# Patient Record
Sex: FEMALE | Race: ASIAN | Employment: FULL TIME | URBAN - METROPOLITAN AREA
[De-identification: names, ages, dates, MRNs, and addresses within clinical notes are randomized per-mention and may not be internally consistent; named-entity substitution may affect disease eponyms.]

---

## 2018-06-18 LAB
HBV SURFACE AG SERPL QL IA: NONREACTIVE
RUBELLA ANTIBODY IGG QUANTITATIVE: NORMAL IU/ML

## 2018-12-30 ENCOUNTER — HOSPITAL ENCOUNTER (INPATIENT)
Facility: CLINIC | Age: 38
LOS: 1 days | Discharge: HOME | End: 2018-12-31
Attending: OBSTETRICS & GYNECOLOGY | Admitting: OBSTETRICS & GYNECOLOGY
Payer: COMMERCIAL

## 2018-12-30 ENCOUNTER — HOSPITAL ENCOUNTER (OUTPATIENT)
Facility: CLINIC | Age: 38
End: 2018-12-30

## 2018-12-30 PROBLEM — Z34.90 TERM PREGNANCY: Status: ACTIVE | Noted: 2018-12-30

## 2018-12-30 LAB
ABO + RH BLD: NORMAL
ABO + RH BLD: NORMAL
ALT SERPL W P-5'-P-CCNC: 11 U/L (ref 0–50)
AMPHETAMINES UR QL SCN: NEGATIVE
AST SERPL W P-5'-P-CCNC: 11 U/L (ref 0–45)
BASOPHILS # BLD AUTO: 0 10E9/L (ref 0–0.2)
BASOPHILS NFR BLD AUTO: 0.3 %
BUN SERPL-MCNC: 12 MG/DL (ref 7–30)
CANNABINOIDS UR QL: NEGATIVE
COCAINE UR QL: NEGATIVE
CREAT SERPL-MCNC: 0.67 MG/DL (ref 0.52–1.04)
DIFFERENTIAL METHOD BLD: NORMAL
EOSINOPHIL # BLD AUTO: 0.2 10E9/L (ref 0–0.7)
EOSINOPHIL NFR BLD AUTO: 1.6 %
ERYTHROCYTE [DISTWIDTH] IN BLOOD BY AUTOMATED COUNT: 13.2 % (ref 10–15)
GFR SERPL CREATININE-BSD FRML MDRD: >90 ML/MIN/{1.73_M2}
GLUCOSE BLDC GLUCOMTR-MCNC: 75 MG/DL (ref 70–99)
GLUCOSE SERPL-MCNC: 90 MG/DL (ref 70–99)
HCT VFR BLD AUTO: 36.2 % (ref 35–47)
HGB BLD-MCNC: 12.3 G/DL (ref 11.7–15.7)
IMM GRANULOCYTES # BLD: 0.1 10E9/L (ref 0–0.4)
IMM GRANULOCYTES NFR BLD: 0.5 %
LYMPHOCYTES # BLD AUTO: 3.1 10E9/L (ref 0.8–5.3)
LYMPHOCYTES NFR BLD AUTO: 30.3 %
MCH RBC QN AUTO: 31.1 PG (ref 26.5–33)
MCHC RBC AUTO-ENTMCNC: 34 G/DL (ref 31.5–36.5)
MCV RBC AUTO: 91 FL (ref 78–100)
MONOCYTES # BLD AUTO: 0.8 10E9/L (ref 0–1.3)
MONOCYTES NFR BLD AUTO: 7.8 %
NEUTROPHILS # BLD AUTO: 6 10E9/L (ref 1.6–8.3)
NEUTROPHILS NFR BLD AUTO: 59.5 %
NRBC # BLD AUTO: 0 10*3/UL
NRBC BLD AUTO-RTO: 0 /100
OPIATES UR QL SCN: NEGATIVE
PCP UR QL SCN: NEGATIVE
PLATELET # BLD AUTO: 185 10E9/L (ref 150–450)
RBC # BLD AUTO: 3.96 10E12/L (ref 3.8–5.2)
SPECIMEN EXP DATE BLD: NORMAL
T PALLIDUM AB SER QL: NONREACTIVE
WBC # BLD AUTO: 10.1 10E9/L (ref 4–11)

## 2018-12-30 PROCEDURE — 00000146 ZZHCL STATISTIC GLUCOSE BY METER IP

## 2018-12-30 PROCEDURE — 82565 ASSAY OF CREATININE: CPT | Performed by: OBSTETRICS & GYNECOLOGY

## 2018-12-30 PROCEDURE — 84450 TRANSFERASE (AST) (SGOT): CPT | Performed by: OBSTETRICS & GYNECOLOGY

## 2018-12-30 PROCEDURE — 85025 COMPLETE CBC W/AUTO DIFF WBC: CPT | Performed by: OBSTETRICS & GYNECOLOGY

## 2018-12-30 PROCEDURE — 80307 DRUG TEST PRSMV CHEM ANLYZR: CPT | Performed by: OBSTETRICS & GYNECOLOGY

## 2018-12-30 PROCEDURE — 25000132 ZZH RX MED GY IP 250 OP 250 PS 637: Performed by: OBSTETRICS & GYNECOLOGY

## 2018-12-30 PROCEDURE — 12000037 ZZH R&B POSTPARTUM INTERMEDIATE

## 2018-12-30 PROCEDURE — 86780 TREPONEMA PALLIDUM: CPT | Performed by: OBSTETRICS & GYNECOLOGY

## 2018-12-30 PROCEDURE — 36415 COLL VENOUS BLD VENIPUNCTURE: CPT | Performed by: OBSTETRICS & GYNECOLOGY

## 2018-12-30 PROCEDURE — G0463 HOSPITAL OUTPT CLINIC VISIT: HCPCS

## 2018-12-30 PROCEDURE — 0HQ9XZZ REPAIR PERINEUM SKIN, EXTERNAL APPROACH: ICD-10-PCS | Performed by: OBSTETRICS & GYNECOLOGY

## 2018-12-30 PROCEDURE — 25000128 H RX IP 250 OP 636: Performed by: OBSTETRICS & GYNECOLOGY

## 2018-12-30 PROCEDURE — 86900 BLOOD TYPING SEROLOGIC ABO: CPT | Performed by: OBSTETRICS & GYNECOLOGY

## 2018-12-30 PROCEDURE — 82947 ASSAY GLUCOSE BLOOD QUANT: CPT | Performed by: OBSTETRICS & GYNECOLOGY

## 2018-12-30 PROCEDURE — 25000125 ZZHC RX 250

## 2018-12-30 PROCEDURE — 25000125 ZZHC RX 250: Performed by: OBSTETRICS & GYNECOLOGY

## 2018-12-30 PROCEDURE — 72200001 ZZH LABOR CARE VAGINAL DELIVERY SINGLE

## 2018-12-30 PROCEDURE — 84520 ASSAY OF UREA NITROGEN: CPT | Performed by: OBSTETRICS & GYNECOLOGY

## 2018-12-30 PROCEDURE — 10907ZC DRAINAGE OF AMNIOTIC FLUID, THERAPEUTIC FROM PRODUCTS OF CONCEPTION, VIA NATURAL OR ARTIFICIAL OPENING: ICD-10-PCS | Performed by: OBSTETRICS & GYNECOLOGY

## 2018-12-30 PROCEDURE — 84460 ALANINE AMINO (ALT) (SGPT): CPT | Performed by: OBSTETRICS & GYNECOLOGY

## 2018-12-30 PROCEDURE — 86901 BLOOD TYPING SEROLOGIC RH(D): CPT | Performed by: OBSTETRICS & GYNECOLOGY

## 2018-12-30 RX ORDER — CETIRIZINE HYDROCHLORIDE 5 MG/1
5 TABLET ORAL DAILY PRN
COMMUNITY

## 2018-12-30 RX ORDER — OXYTOCIN 10 [USP'U]/ML
10 INJECTION, SOLUTION INTRAMUSCULAR; INTRAVENOUS
Status: DISCONTINUED | OUTPATIENT
Start: 2018-12-30 | End: 2018-12-31 | Stop reason: HOSPADM

## 2018-12-30 RX ORDER — FENTANYL CITRATE 50 UG/ML
50-100 INJECTION, SOLUTION INTRAMUSCULAR; INTRAVENOUS
Status: DISCONTINUED | OUTPATIENT
Start: 2018-12-30 | End: 2018-12-30

## 2018-12-30 RX ORDER — IBUPROFEN 400 MG/1
800 TABLET, FILM COATED ORAL
Status: DISCONTINUED | OUTPATIENT
Start: 2018-12-30 | End: 2018-12-30

## 2018-12-30 RX ORDER — DEXTROSE MONOHYDRATE 25 G/50ML
25-50 INJECTION, SOLUTION INTRAVENOUS
Status: DISCONTINUED | OUTPATIENT
Start: 2018-12-30 | End: 2018-12-31 | Stop reason: HOSPADM

## 2018-12-30 RX ORDER — NALOXONE HYDROCHLORIDE 0.4 MG/ML
.1-.4 INJECTION, SOLUTION INTRAMUSCULAR; INTRAVENOUS; SUBCUTANEOUS
Status: DISCONTINUED | OUTPATIENT
Start: 2018-12-30 | End: 2018-12-31 | Stop reason: HOSPADM

## 2018-12-30 RX ORDER — NICOTINE POLACRILEX 4 MG
15-30 LOZENGE BUCCAL
Status: DISCONTINUED | OUTPATIENT
Start: 2018-12-30 | End: 2018-12-31 | Stop reason: HOSPADM

## 2018-12-30 RX ORDER — OXYTOCIN/0.9 % SODIUM CHLORIDE 30/500 ML
340 PLASTIC BAG, INJECTION (ML) INTRAVENOUS CONTINUOUS PRN
Status: DISCONTINUED | OUTPATIENT
Start: 2018-12-30 | End: 2018-12-31 | Stop reason: HOSPADM

## 2018-12-30 RX ORDER — NALBUPHINE HYDROCHLORIDE 10 MG/ML
10-20 INJECTION, SOLUTION INTRAMUSCULAR; INTRAVENOUS; SUBCUTANEOUS
Status: DISCONTINUED | OUTPATIENT
Start: 2018-12-30 | End: 2018-12-30

## 2018-12-30 RX ORDER — AMOXICILLIN 250 MG
2 CAPSULE ORAL 2 TIMES DAILY
Status: DISCONTINUED | OUTPATIENT
Start: 2018-12-30 | End: 2018-12-31 | Stop reason: HOSPADM

## 2018-12-30 RX ORDER — LIDOCAINE HYDROCHLORIDE 10 MG/ML
INJECTION, SOLUTION INFILTRATION; PERINEURAL
Status: DISCONTINUED
Start: 2018-12-30 | End: 2018-12-30 | Stop reason: HOSPADM

## 2018-12-30 RX ORDER — OXYTOCIN/0.9 % SODIUM CHLORIDE 30/500 ML
100 PLASTIC BAG, INJECTION (ML) INTRAVENOUS CONTINUOUS
Status: DISCONTINUED | OUTPATIENT
Start: 2018-12-30 | End: 2018-12-31 | Stop reason: HOSPADM

## 2018-12-30 RX ORDER — OXYTOCIN/0.9 % SODIUM CHLORIDE 30/500 ML
100-340 PLASTIC BAG, INJECTION (ML) INTRAVENOUS CONTINUOUS PRN
Status: COMPLETED | OUTPATIENT
Start: 2018-12-30 | End: 2018-12-30

## 2018-12-30 RX ORDER — METHYLERGONOVINE MALEATE 0.2 MG/ML
200 INJECTION INTRAVENOUS
Status: DISCONTINUED | OUTPATIENT
Start: 2018-12-30 | End: 2018-12-30

## 2018-12-30 RX ORDER — ACETAMINOPHEN 325 MG/1
650 TABLET ORAL EVERY 4 HOURS PRN
Status: DISCONTINUED | OUTPATIENT
Start: 2018-12-30 | End: 2018-12-31 | Stop reason: HOSPADM

## 2018-12-30 RX ORDER — IBUPROFEN 400 MG/1
800 TABLET, FILM COATED ORAL EVERY 6 HOURS PRN
Status: DISCONTINUED | OUTPATIENT
Start: 2018-12-30 | End: 2018-12-31 | Stop reason: HOSPADM

## 2018-12-30 RX ORDER — OXYTOCIN 10 [USP'U]/ML
10 INJECTION, SOLUTION INTRAMUSCULAR; INTRAVENOUS
Status: DISCONTINUED | OUTPATIENT
Start: 2018-12-30 | End: 2018-12-30

## 2018-12-30 RX ORDER — CARBOPROST TROMETHAMINE 250 UG/ML
250 INJECTION, SOLUTION INTRAMUSCULAR
Status: DISCONTINUED | OUTPATIENT
Start: 2018-12-30 | End: 2018-12-30

## 2018-12-30 RX ORDER — AMOXICILLIN 250 MG
1 CAPSULE ORAL 2 TIMES DAILY
Status: DISCONTINUED | OUTPATIENT
Start: 2018-12-30 | End: 2018-12-31 | Stop reason: HOSPADM

## 2018-12-30 RX ORDER — ACETAMINOPHEN 325 MG/1
650 TABLET ORAL EVERY 4 HOURS PRN
Status: DISCONTINUED | OUTPATIENT
Start: 2018-12-30 | End: 2018-12-30

## 2018-12-30 RX ORDER — BISACODYL 10 MG
10 SUPPOSITORY, RECTAL RECTAL DAILY PRN
Status: DISCONTINUED | OUTPATIENT
Start: 2019-01-01 | End: 2018-12-31 | Stop reason: HOSPADM

## 2018-12-30 RX ORDER — OXYTOCIN/0.9 % SODIUM CHLORIDE 30/500 ML
PLASTIC BAG, INJECTION (ML) INTRAVENOUS
Status: COMPLETED
Start: 2018-12-30 | End: 2018-12-30

## 2018-12-30 RX ORDER — ONDANSETRON 2 MG/ML
4 INJECTION INTRAMUSCULAR; INTRAVENOUS EVERY 6 HOURS PRN
Status: DISCONTINUED | OUTPATIENT
Start: 2018-12-30 | End: 2018-12-30

## 2018-12-30 RX ORDER — OXYCODONE AND ACETAMINOPHEN 5; 325 MG/1; MG/1
1 TABLET ORAL
Status: DISCONTINUED | OUTPATIENT
Start: 2018-12-30 | End: 2018-12-30

## 2018-12-30 RX ORDER — SODIUM CHLORIDE, SODIUM LACTATE, POTASSIUM CHLORIDE, CALCIUM CHLORIDE 600; 310; 30; 20 MG/100ML; MG/100ML; MG/100ML; MG/100ML
INJECTION, SOLUTION INTRAVENOUS CONTINUOUS
Status: DISCONTINUED | OUTPATIENT
Start: 2018-12-30 | End: 2018-12-30

## 2018-12-30 RX ORDER — PROCHLORPERAZINE 25 MG
25 SUPPOSITORY, RECTAL RECTAL EVERY 12 HOURS PRN
Status: DISCONTINUED | OUTPATIENT
Start: 2018-12-30 | End: 2018-12-30

## 2018-12-30 RX ORDER — HYDROCORTISONE 2.5 %
CREAM (GRAM) TOPICAL 3 TIMES DAILY PRN
Status: DISCONTINUED | OUTPATIENT
Start: 2018-12-30 | End: 2018-12-31 | Stop reason: HOSPADM

## 2018-12-30 RX ORDER — LANOLIN 100 %
OINTMENT (GRAM) TOPICAL
Status: DISCONTINUED | OUTPATIENT
Start: 2018-12-30 | End: 2018-12-31 | Stop reason: HOSPADM

## 2018-12-30 RX ORDER — NALOXONE HYDROCHLORIDE 0.4 MG/ML
.1-.4 INJECTION, SOLUTION INTRAMUSCULAR; INTRAVENOUS; SUBCUTANEOUS
Status: DISCONTINUED | OUTPATIENT
Start: 2018-12-30 | End: 2018-12-30

## 2018-12-30 RX ADMIN — SENNOSIDES AND DOCUSATE SODIUM 1 TABLET: 8.6; 5 TABLET ORAL at 19:52

## 2018-12-30 RX ADMIN — IBUPROFEN 800 MG: 400 TABLET ORAL at 01:51

## 2018-12-30 RX ADMIN — OXYTOCIN-SODIUM CHLORIDE 0.9% IV SOLN 30 UNIT/500ML 100 ML/HR: 30-0.9/5 SOLUTION at 01:20

## 2018-12-30 RX ADMIN — Medication 340 ML/HR: at 00:42

## 2018-12-30 RX ADMIN — OXYTOCIN-SODIUM CHLORIDE 0.9% IV SOLN 30 UNIT/500ML 340 ML/HR: 30-0.9/5 SOLUTION at 00:42

## 2018-12-30 RX ADMIN — SENNOSIDES AND DOCUSATE SODIUM 1 TABLET: 8.6; 5 TABLET ORAL at 07:44

## 2018-12-30 RX ADMIN — ACETAMINOPHEN 650 MG: 325 TABLET, FILM COATED ORAL at 12:18

## 2018-12-30 RX ADMIN — IBUPROFEN 800 MG: 400 TABLET ORAL at 07:44

## 2018-12-30 RX ADMIN — ACETAMINOPHEN 650 MG: 325 TABLET, FILM COATED ORAL at 01:51

## 2018-12-30 RX ADMIN — IBUPROFEN 800 MG: 400 TABLET ORAL at 14:08

## 2018-12-30 RX ADMIN — SODIUM CHLORIDE, POTASSIUM CHLORIDE, SODIUM LACTATE AND CALCIUM CHLORIDE 125 ML/HR: 600; 310; 30; 20 INJECTION, SOLUTION INTRAVENOUS at 00:25

## 2018-12-30 SDOH — HEALTH STABILITY: MENTAL HEALTH: HOW OFTEN DO YOU HAVE A DRINK CONTAINING ALCOHOL?: NEVER

## 2018-12-30 ASSESSMENT — ACTIVITIES OF DAILY LIVING (ADL): FALL_HISTORY_WITHIN_LAST_SIX_MONTHS: NO

## 2018-12-30 NOTE — PLAN OF CARE
Data; Kemar Bradshaw   transferred to 409 via wheelchair at 0330. Baby transferred via parent's arms.  Action: Receiving unit notified of transfer: Yes. Patient and family notified of room change. Report given to LYNNETTE Beach at 0330 Belongings sent to receiving unit. Accompanied by Registered Nurse. Oriented patient to surroundings. Call light within reach. ID bands double-checked with receiving RN.  Response: Patient tolerated transfer and is stable.

## 2018-12-30 NOTE — PLAN OF CARE
0010- Patient arrived per wheelchair to Triage. Appears uncomfortable.Patient states is negative for GBS, has Velamentous Cord insertion and is  A gestational diabetic- diet controlled, states last ate at 2030 and last blood sugar was done at 2100 was 132. States see KENNETH Finnegan at the [facility] clinic on [facility]. Monitors applied with consent. States is having some bleeding.  Dark red blood on chux. SVE done. 8 cms. Taken to room 231 in bed. Keeping patient. Dr. Laguna notified of patient arrival, cervix exam and bleeding by Zaynab Wells. MD Here is hospital. Coming to room. IV started bu Isa Gamez RN, monitors reapplied. Patient seeling some pressure, SVE per Dr. Laguna. 9+ cm, AROM done with clear. Bloody tinged fluid.at 0028.   0038 feeling more pressure, ant lip  0040 complete, Dr. Laguna at bed side, legs in foot rest. Patient pushing.  0042 viable female infant. Pitocin stated per MD request.   0046- Placenta delivery. Small laceration repaired per Dr. Laguna.  0047 agpars 9/9

## 2018-12-30 NOTE — H&P
There has been no significant change with Kemar's general health status based upon review of records and exam.    Kemar is a 38 year old  IUP at 38 weeks presents to L/D in active labor. Was diverted from [facility].    Cx= 8cm/100%/-1 + BRB    H/O gestational diabetes diet controlled    AROM= clear fluid    GBS negative    A: IUP at 38 weeks      Active labor    P: anticipate

## 2018-12-30 NOTE — PLAN OF CARE
Pt. admitted from L&D  via wheelchair and transferred to bed at 0330. Pt. arrived with baby and was accompanied by . Arrived with personal belongings. Report was taken from LYNNETTE Umaña in L&D. VSS. Fundus is firm and midline.  Vaginal bleeding is light. IV infusing pitocin.  Pt. oriented to the room and call light system. Pain well controlled with tylenol and ibuprofen.  Planning on breastfeeding and bottling formula.  providing support at beside. Plan to check fasting blood glucose this AM. Questions encouraged. On pathway. Continue to monitor and notify MD as needed.

## 2018-12-30 NOTE — PLAN OF CARE
Patient complains of moderate uterine cramping.  Using warm packs/aqua-k for comfort.  Having adequate relief with tylenol and ibuprofen PO.  Fundus firm below umbilicus, lochia within normal limits, no clots.  Voiding adequately.  Will continue to monitor.

## 2018-12-30 NOTE — PROGRESS NOTES
Doing well. Sitting up. Baby about to get bathed.     vss afebrile  HEENT nl  Abdomen soft and non tender  Fundus firm and non tender  Extremities nl    BS= 75    A: PPD 0  S/P  was diverted from [facility]      Gestational diabetes diet controlled      Doing well  P: routine pp care      Discharge tomorrow

## 2018-12-30 NOTE — L&D DELIVERY NOTE
Delivery Date:  2018      Kemar is a 38-year-old  3, now para 3 with intrauterine pregnancy at 38 weeks gestation who was admitted to [facility] after being diverted from [facility].  She started to have strong contractions approximately 2 hours before admission.  Upon admission, she was noted to be 8 cm dilated, 100% effaced, -1 station and there was bright red blood noted in the perineal area.  The patient had a history of a velamentous cord insertion and gestational diabetes, diet-controlled.  Her blood pressure also was noted to be mildly elevated upon admission.  Artificial rupture of membranes was performed, clear fluid was noted.  At this point in time, the patient now was an anterior lip.  Within 10 minutes she was completely dilated.  She pushed through 2 contractions, delivering over a midline tear.  The anterior shoulder delivered easily followed by the rest of body.  The baby was a female infant who was vigorous and crying upon delivery.  She had Apgar scores of 9 at 1 minute and 9 at 5 minutes.  Her weight is pending at this time.  Delayed cord clamping was observed for 1 minute.  The cord was doubly clamped and ligated by the father of the baby.  The patient was given IV Pitocin.  Inspection revealed that the patient sustained a first-degree midline tear.  This area was infiltrated with 1% lidocaine, approximately 5 mL was used, and it was repaired with 3-0 Vicryl and 3-0 chromic suture in the usual manner.  The QEBL is pending at this time.  PIH labs have been drawn and are pending.  The patient and the baby otherwise are doing well post repair.         AMANDO HUNG MD             D: 2018   T: 2018   MT: MARLON      Name:     KEMAR GUIDRY   MRN:      [id number]        Account:        [id number]   :      1980        Delivery Date:  2018               Document: [id number]       cc: Amando Hung MD

## 2018-12-31 VITALS
SYSTOLIC BLOOD PRESSURE: 127 MMHG | RESPIRATION RATE: 16 BRPM | TEMPERATURE: 98.2 F | HEART RATE: 96 BPM | DIASTOLIC BLOOD PRESSURE: 91 MMHG

## 2018-12-31 PROCEDURE — 25000132 ZZH RX MED GY IP 250 OP 250 PS 637: Performed by: OBSTETRICS & GYNECOLOGY

## 2018-12-31 RX ORDER — BREAST PUMP
1 EACH MISCELLANEOUS ONCE
Qty: 1 EACH | Refills: 0 | Status: SHIPPED | OUTPATIENT
Start: 2018-12-31 | End: 2018-12-31

## 2018-12-31 RX ADMIN — ACETAMINOPHEN 650 MG: 325 TABLET, FILM COATED ORAL at 06:24

## 2018-12-31 RX ADMIN — IBUPROFEN 800 MG: 400 TABLET ORAL at 09:43

## 2018-12-31 RX ADMIN — SENNOSIDES AND DOCUSATE SODIUM 1 TABLET: 8.6; 5 TABLET ORAL at 09:43

## 2018-12-31 RX ADMIN — ACETAMINOPHEN 650 MG: 325 TABLET, FILM COATED ORAL at 12:26

## 2018-12-31 RX ADMIN — IBUPROFEN 800 MG: 400 TABLET ORAL at 02:43

## 2018-12-31 NOTE — PLAN OF CARE
Pt is up independently.  Tylenol and ibuprofen for pain.  Pt passed one medium-large clot.  Bleeding has been scant.  Fundus remains firm.  Continue to monitor.

## 2018-12-31 NOTE — PROGRESS NOTES
December 31, 2018      SUBJECTIVE: No acute overnight events.  Mild abdominal cramping. +Lochia light.  Tolerating PO. Ambulating/urinating w/o difficulty.  Denies CP/palp/SOB/LH.    OBJECTIVE:  /77   Pulse 96   Temp 97.6  F (36.4  C) (Oral)   Resp 16   Breastfeeding? Unknown   Gen: NAD, A&O x 3  Abd: Uterus firm, contracted, NT  Ext: mild edema BL LEs, symmetric, no CT    Hemoglobin   Date Value Ref Range Status   12/30/2018 12.3 11.7 - 15.7 g/dL Final   ]    A/P: PPD#2 s/p normal vaginal delivery.  Doing well.  Afebrile, VSS.  - ibuprofen, tylenol prn pain  - breastfeeding  - regular diet as tolerated  - routine postpartum care  - [facility], f/u with them in 6 weeks.  She will need to undergo 2h gtt due to GDM during pregnancy - pt is aware of this.      ANNEL ARGUELLO MD

## 2018-12-31 NOTE — PLAN OF CARE
Patient excited to go home.  Having adequate pain control with ibuprofen and tylenol PO.  Prescription given for breast pump per patient request.  Discharge instructions explained to patient and all questions/concerns addressed.

## 2019-01-10 ENCOUNTER — TRANSFERRED RECORDS (OUTPATIENT)
Dept: HEALTH INFORMATION MANAGEMENT | Facility: CLINIC | Age: 39
End: 2019-01-10

## 2019-01-13 ENCOUNTER — DOCUMENTATION ONLY (OUTPATIENT)
Dept: CARE COORDINATION | Facility: CLINIC | Age: 39
End: 2019-01-13

## 2019-07-10 NOTE — CONSULTS
NUTRITION ASSESSMENT    REASON FOR ASSESSMENT:    Admission Nutrition Risk Screen for  New/Uncontrolled Diabetes     CURRENT DIET AND NOURISHMENT ORDER:  Information obtained from Epic records.    Diet: Regular    Current Intake/Tolerance:  N/a    NUTRITION STATUS VALIDATION:  Weight status: Overweight BMI 25-29.9    MALNUTRITION:  Patient does not meet two of the following criteria necessary for diagnosing malnutrition: significant weight loss, reduced intake, subcutaneous fat loss, muscle loss or fluid retention    NUTRITION DIAGNOSIS:  No nutrition diagnosis at this time.    INTERVENTION:    Nutrition Prescription:     Gestational diabetes intervention not indicated as patient has already delivered.    Anticipate patient will discharge on regular diet    Implementation:      Diet education - per MD order or as appropriate    Follow Up/Monitoring:      No need for further follow up unless another consult is received.    Ana Maria Porter, RD, LD, CNSC     no concerns

## 2019-11-06 ENCOUNTER — HEALTH MAINTENANCE LETTER (OUTPATIENT)
Age: 39
End: 2019-11-06

## 2020-11-29 ENCOUNTER — HEALTH MAINTENANCE LETTER (OUTPATIENT)
Age: 40
End: 2020-11-29

## 2021-05-29 ENCOUNTER — RECORDS - HEALTHEAST (OUTPATIENT)
Dept: ADMINISTRATIVE | Facility: CLINIC | Age: 41
End: 2021-05-29

## 2021-09-19 ENCOUNTER — HEALTH MAINTENANCE LETTER (OUTPATIENT)
Age: 41
End: 2021-09-19

## 2022-01-09 ENCOUNTER — HEALTH MAINTENANCE LETTER (OUTPATIENT)
Age: 42
End: 2022-01-09

## 2022-11-21 ENCOUNTER — HEALTH MAINTENANCE LETTER (OUTPATIENT)
Age: 42
End: 2022-11-21

## 2023-04-16 ENCOUNTER — HEALTH MAINTENANCE LETTER (OUTPATIENT)
Age: 43
End: 2023-04-16

## 2024-02-04 ENCOUNTER — HEALTH MAINTENANCE LETTER (OUTPATIENT)
Age: 44
End: 2024-02-04